# Patient Record
Sex: FEMALE | Race: ASIAN | NOT HISPANIC OR LATINO
[De-identification: names, ages, dates, MRNs, and addresses within clinical notes are randomized per-mention and may not be internally consistent; named-entity substitution may affect disease eponyms.]

---

## 2018-10-12 ENCOUNTER — RESULT REVIEW (OUTPATIENT)
Age: 43
End: 2018-10-12

## 2019-09-12 ENCOUNTER — FORM ENCOUNTER (OUTPATIENT)
Age: 44
End: 2019-09-12

## 2020-01-30 ENCOUNTER — RESULT REVIEW (OUTPATIENT)
Age: 45
End: 2020-01-30

## 2020-02-20 ENCOUNTER — RESULT REVIEW (OUTPATIENT)
Age: 45
End: 2020-02-20

## 2020-04-19 ENCOUNTER — FORM ENCOUNTER (OUTPATIENT)
Age: 45
End: 2020-04-19

## 2020-10-15 ENCOUNTER — FORM ENCOUNTER (OUTPATIENT)
Age: 45
End: 2020-10-15

## 2021-03-04 ENCOUNTER — RESULT REVIEW (OUTPATIENT)
Age: 46
End: 2021-03-04

## 2021-06-01 PROBLEM — Z00.00 ENCOUNTER FOR PREVENTIVE HEALTH EXAMINATION: Status: ACTIVE | Noted: 2021-06-01

## 2021-10-20 DIAGNOSIS — Z80.0 FAMILY HISTORY OF MALIGNANT NEOPLASM OF DIGESTIVE ORGANS: ICD-10-CM

## 2021-10-20 DIAGNOSIS — Z78.9 OTHER SPECIFIED HEALTH STATUS: ICD-10-CM

## 2021-10-22 ENCOUNTER — APPOINTMENT (OUTPATIENT)
Dept: BREAST CENTER | Facility: CLINIC | Age: 46
End: 2021-10-22

## 2021-10-26 ENCOUNTER — NON-APPOINTMENT (OUTPATIENT)
Age: 46
End: 2021-10-26

## 2021-10-26 ENCOUNTER — APPOINTMENT (OUTPATIENT)
Dept: BREAST CENTER | Facility: CLINIC | Age: 46
End: 2021-10-26
Payer: COMMERCIAL

## 2021-10-26 VITALS
BODY MASS INDEX: 23.9 KG/M2 | WEIGHT: 140 LBS | HEART RATE: 85 BPM | OXYGEN SATURATION: 95 % | DIASTOLIC BLOOD PRESSURE: 99 MMHG | HEIGHT: 64 IN | SYSTOLIC BLOOD PRESSURE: 151 MMHG

## 2021-10-26 PROCEDURE — 99213 OFFICE O/P EST LOW 20 MIN: CPT

## 2021-10-26 NOTE — REASON FOR VISIT
[Follow-Up: _____] : a [unfilled] follow-up visit [FreeTextEntry1] : The patient comes in with a history of a right breast 3:00 area of highly suspicious calcifications seen on mammography in October 2015.  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  She underwent the right breast total mastectomy and direct to implant reconstruction on December 14, 2015 and had 3 negative sentinel lymph nodes which is intermediate grade DCIS but negative margins.  She decided against any postoperative endocrine therapy and comes in now for routine follow-up and gets continued left breast mammography.

## 2021-10-26 NOTE — ASSESSMENT
[FreeTextEntry1] : The patient is a 46-year-old  premenopausal female of [place] descent.  She underwent menarche at age 11 and had her first child at age 32.  She has no family history of breast or ovarian cancer but her father passed away from colon cancer at age 56.  The patient underwent her for screening mammography in 2015 showing some highly suspicious calcifications in the right breast medial 3:00 region as well as some right breast retroareolar calcifications.  There was some left breast upper inner quadrant calcifications as well.  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  She underwent [employer] genetic testing preoperatively which was negative.  Preoperative MRI performed on 2015 just showed the known biopsy-proven DCIS in the right breast and the left breast was negative.  She underwent a right breast total mastectomy and direct to implant reconstruction on 2015 and had 3 negative sentinel lymph nodes and pathology just showed some residual intermediate grade DCIS in the right breast.  She did well postoperatively and decided against any endocrine therapy since she did have a mastectomy for DCIS.  Last left breast mammography and ultrasound performed at [facility] on 2020 just showed some stable scattered calcifications and benign densities.  She is due for her left breast mammography and ultrasound again in 2021 and was given prescriptions.  She was reassured and I would like to see her again in 1 year in 2022 for routine follow-up.
no

## 2021-10-26 NOTE — PHYSICAL EXAM
[Normocephalic] : normocephalic [Atraumatic] : atraumatic [EOMI] : extra ocular movement intact [Supple] : supple [No Supraclavicular Adenopathy] : no supraclavicular adenopathy [No Cervical Adenopathy] : no cervical adenopathy [Examined in the supine and seated position] : examined in the supine and seated position [No dominant masses] : no dominant masses in right breast  [No dominant masses] : no dominant masses left breast [No Nipple Retraction] : no left nipple retraction [No Nipple Discharge] : no left nipple discharge [Breast Mass Right Breast ___cm] : no masses [Breast Nipple Inversion Left] : nipple not inverted [Breast Nipple Retraction Left] : nipple not retracted [Breast Nipple Flattening Left] : nipple not flattened [Breast Nipple Fissures Left] : nipple not fissured [Breast Abnormal Lactation (Galactorrhea) Left] : no galactorrhea [Breast Abnormal Secretion Bloody Fluid Left] : no bloody discharge [Breast Abnormal Secretion Serous Fluid Left] : no serous discharge [Breast Abnormal Secretion Opalescent Fluid Left] : no milky discharge [Breast Mass Left Breast ___cm] : no masses [No Axillary Lymphadenopathy] : no left axillary lymphadenopathy [No Edema] : no edema [No Rashes] : no rashes [No Ulceration] : no ulceration [de-identified] : On exam, the patient has the obvious right breast total mastectomy with direct to implant reconstruction.  She has no evidence of recurrence over the right implant.  She has no suspicious findings in the left breast.  She has no axillary, supraclavicular, or cervical adenopathy. [de-identified] : Status post total mastectomy with direct to implant reconstruction with no evidence of recurrence

## 2021-10-26 NOTE — HISTORY OF PRESENT ILLNESS
[FreeTextEntry1] : The patient is a 46-year-old  premenopausal female of Chinese descent.  She underwent menarche at age 11 and had her first child at age 32.  She has no family history of breast or ovarian cancer but her father passed away from colon cancer at age 56.  The patient underwent her for screening mammography in 2015 showing some highly suspicious calcifications in the right breast medial 3:00 region as well as some right breast retroareolar calcifications.  There was some left breast upper inner quadrant calcifications as well.  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  She underwent [employer] genetic testing preoperatively which was negative.  Preoperative MRI performed on 2015 just showed the known biopsy-proven DCIS in the right breast and the left breast was negative.  She underwent a right breast total mastectomy and direct to implant reconstruction on 2015 and had 3 negative sentinel lymph nodes and pathology just showed some residual intermediate grade DCIS in the right breast.  She did well postoperatively and decided against any endocrine therapy since she did have a mastectomy for DCIS.  She comes in for yearly follow-up and continues to get routine left breast mammography.

## 2022-04-07 ENCOUNTER — RESULT REVIEW (OUTPATIENT)
Age: 47
End: 2022-04-07

## 2022-11-28 ENCOUNTER — RESULT REVIEW (OUTPATIENT)
Age: 47
End: 2022-11-28

## 2022-12-02 ENCOUNTER — APPOINTMENT (OUTPATIENT)
Dept: BREAST CENTER | Facility: CLINIC | Age: 47
End: 2022-12-02

## 2022-12-02 ENCOUNTER — NON-APPOINTMENT (OUTPATIENT)
Age: 47
End: 2022-12-02

## 2022-12-02 VITALS
HEART RATE: 73 BPM | BODY MASS INDEX: 22.2 KG/M2 | HEIGHT: 64 IN | DIASTOLIC BLOOD PRESSURE: 82 MMHG | WEIGHT: 130 LBS | SYSTOLIC BLOOD PRESSURE: 125 MMHG | OXYGEN SATURATION: 99 %

## 2022-12-02 DIAGNOSIS — Z12.31 ENCOUNTER FOR SCREENING MAMMOGRAM FOR MALIGNANT NEOPLASM OF BREAST: ICD-10-CM

## 2022-12-02 PROCEDURE — 99213 OFFICE O/P EST LOW 20 MIN: CPT

## 2022-12-02 NOTE — ASSESSMENT
[FreeTextEntry1] : The patient is a 47-year-old  premenopausal female of Chinese descent.  She underwent menarche at age 11 and had her first child at age 32.  She has no family history of breast or ovarian cancer but her father passed away from colon cancer at age 56.  The patient underwent a screening mammography on 2015 showing some highly suspicious calcifications in the right breast medial 3:00 region as well as some right breast retroareolar calcifications.  There was some left breast upper inner quadrant calcifications as well.  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  She underwent [employer] genetic testing preoperatively which was negative.  Preoperative MRI performed on 2015 just showed the known biopsy-proven DCIS in the right breast and the left breast was negative.  She underwent a right breast total mastectomy and direct to implant reconstruction on 2015 and had 3 negative sentinel lymph nodes and pathology just showed some residual intermediate grade DCIS in the right breast.  She did well postoperatively and decided against any endocrine therapy since she did have a mastectomy for DCIS.  Her last left breast mammography and ultrasound performed at [facility] on 2022 was reviewed and just showed some stable scattered calcifications and benign densities.  She is due for her left breast mammography and ultrasound again in 2023 and was given prescriptions.  She was reassured and I would like to see her again in 1 year in 2023 for routine follow-up.

## 2022-12-02 NOTE — HISTORY OF PRESENT ILLNESS
[FreeTextEntry1] : The patient is a 47-year-old  premenopausal female of Chinese descent.  She underwent menarche at age 11 and had her first child at age 32.  She has no family history of breast or ovarian cancer but her father passed away from colon cancer at age 56.  The patient underwent a screening mammography on 2015 showing some highly suspicious calcifications in the right breast medial 3:00 region as well as some right breast retroareolar calcifications.  There was some left breast upper inner quadrant calcifications as well.  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  She underwent [employer] genetic testing preoperatively which was negative.  Preoperative MRI performed on 2015 just showed the known biopsy-proven DCIS in the right breast and the left breast was negative.  She underwent a right breast total mastectomy and direct to implant reconstruction on 2015 and had 3 negative sentinel lymph nodes and pathology just showed some residual intermediate grade DCIS in the right breast.  She did well postoperatively and decided against any endocrine therapy since she did have a mastectomy for DCIS.  She comes in for yearly follow-up and continues to get routine left breast mammography.

## 2022-12-02 NOTE — PHYSICAL EXAM
[Normocephalic] : normocephalic [Atraumatic] : atraumatic [EOMI] : extra ocular movement intact [Supple] : supple [No Supraclavicular Adenopathy] : no supraclavicular adenopathy [No Cervical Adenopathy] : no cervical adenopathy [Examined in the supine and seated position] : examined in the supine and seated position [No dominant masses] : no dominant masses in right breast  [No dominant masses] : no dominant masses left breast [No Nipple Retraction] : no left nipple retraction [No Nipple Discharge] : no left nipple discharge [Breast Mass Right Breast ___cm] : no masses [Breast Mass Left Breast ___cm] : no masses [No Axillary Lymphadenopathy] : no left axillary lymphadenopathy [No Edema] : no edema [No Rashes] : no rashes [No Ulceration] : no ulceration [Breast Nipple Inversion Left] : nipple not inverted [Breast Nipple Retraction Left] : nipple not retracted [Breast Nipple Flattening Left] : nipple not flattened [Breast Nipple Fissures Left] : nipple not fissured [Breast Abnormal Lactation (Galactorrhea) Left] : no galactorrhea [Breast Abnormal Secretion Bloody Fluid Left] : no bloody discharge [Breast Abnormal Secretion Serous Fluid Left] : no serous discharge [Breast Abnormal Secretion Opalescent Fluid Left] : no milky discharge [de-identified] : On exam, the patient has the obvious right breast total mastectomy with direct to implant reconstruction.  She has no evidence of recurrence over the right implant.  She has no suspicious findings in the left breast.  She has no axillary, supraclavicular, or cervical adenopathy. [de-identified] : Status post total mastectomy with direct to implant reconstruction with no evidence of recurrence

## 2023-07-27 ENCOUNTER — OFFICE (OUTPATIENT)
Dept: URBAN - METROPOLITAN AREA CLINIC 122 | Facility: CLINIC | Age: 48
Setting detail: OPHTHALMOLOGY
End: 2023-07-27
Payer: COMMERCIAL

## 2023-07-27 ENCOUNTER — RX ONLY (RX ONLY)
Age: 48
End: 2023-07-27

## 2023-07-27 DIAGNOSIS — H35.40: ICD-10-CM

## 2023-07-27 DIAGNOSIS — H44.23: ICD-10-CM

## 2023-07-27 DIAGNOSIS — H40.1223: ICD-10-CM

## 2023-07-27 PROCEDURE — 99204 OFFICE O/P NEW MOD 45 MIN: CPT | Performed by: OPHTHALMOLOGY

## 2023-07-27 PROCEDURE — 92133 CPTRZD OPH DX IMG PST SGM ON: CPT | Performed by: OPHTHALMOLOGY

## 2023-07-27 PROCEDURE — 92020 GONIOSCOPY: CPT | Performed by: OPHTHALMOLOGY

## 2023-07-27 PROCEDURE — 76514 ECHO EXAM OF EYE THICKNESS: CPT | Performed by: OPHTHALMOLOGY

## 2023-07-27 ASSESSMENT — REFRACTION_AUTOREFRACTION
OS_SPHERE: -7.75
OD_SPHERE: -8.00
OS_AXIS: 30
OD_AXIS: 5
OS_CYLINDER: -0.50
OD_CYLINDER: -0.50

## 2023-07-27 ASSESSMENT — REFRACTION_CURRENTRX
OD_SPHERE: -7.75
OS_AXIS: 5
OS_OVR_VA: 20/
OD_CYLINDER: -0.50
OS_CYLINDER: -0.75
OD_OVR_VA: 20/
OS_SPHERE: -8.00
OD_AXIS: 56

## 2023-07-27 ASSESSMENT — TONOMETRY
OD_IOP_MMHG: 11
OS_IOP_MMHG: 11

## 2023-07-27 ASSESSMENT — VISUAL ACUITY
OS_BCVA: 20/20
OD_BCVA: 20/50+1

## 2023-07-27 ASSESSMENT — CONFRONTATIONAL VISUAL FIELD TEST (CVF)
OS_FINDINGS: FULL
OD_FINDINGS: FULL

## 2023-07-27 ASSESSMENT — PACHYMETRY
OS_CT_UM: 538
OD_CT_UM: 521
OS_CT_CORRECTION: 1
OD_CT_CORRECTION: 1

## 2023-07-27 ASSESSMENT — SPHEQUIV_DERIVED
OS_SPHEQUIV: -8
OD_SPHEQUIV: -8.25

## 2023-09-08 ENCOUNTER — OFFICE (OUTPATIENT)
Dept: URBAN - METROPOLITAN AREA CLINIC 122 | Facility: CLINIC | Age: 48
Setting detail: OPHTHALMOLOGY
End: 2023-09-08
Payer: COMMERCIAL

## 2023-09-08 DIAGNOSIS — H40.1223: ICD-10-CM

## 2023-09-08 DIAGNOSIS — H35.40: ICD-10-CM

## 2023-09-08 DIAGNOSIS — H44.23: ICD-10-CM

## 2023-09-08 DIAGNOSIS — H40.011: ICD-10-CM

## 2023-09-08 PROCEDURE — 92012 INTRM OPH EXAM EST PATIENT: CPT | Performed by: OPHTHALMOLOGY

## 2023-09-08 PROCEDURE — 92083 EXTENDED VISUAL FIELD XM: CPT | Performed by: OPHTHALMOLOGY

## 2023-09-08 ASSESSMENT — REFRACTION_CURRENTRX
OD_CYLINDER: -0.50
OS_OVR_VA: 20/
OD_SPHERE: -7.75
OS_AXIS: 5
OS_SPHERE: -8.00
OD_AXIS: 56
OS_CYLINDER: -0.75
OD_OVR_VA: 20/

## 2023-09-08 ASSESSMENT — REFRACTION_AUTOREFRACTION
OS_AXIS: 30
OS_SPHERE: -7.75
OD_AXIS: 5
OS_CYLINDER: -0.50
OD_SPHERE: -8.00
OD_CYLINDER: -0.50

## 2023-09-08 ASSESSMENT — PACHYMETRY
OD_CT_CORRECTION: 1
OD_CT_UM: 521
OS_CT_CORRECTION: 1
OS_CT_UM: 538

## 2023-09-08 ASSESSMENT — TONOMETRY
OD_IOP_MMHG: 13
OS_IOP_MMHG: 13

## 2023-09-08 ASSESSMENT — VISUAL ACUITY
OD_BCVA: 20/50
OS_BCVA: 20/20

## 2023-09-08 ASSESSMENT — SPHEQUIV_DERIVED
OS_SPHEQUIV: -8
OD_SPHEQUIV: -8.25

## 2023-11-17 ENCOUNTER — OFFICE (OUTPATIENT)
Dept: URBAN - METROPOLITAN AREA CLINIC 122 | Facility: CLINIC | Age: 48
Setting detail: OPHTHALMOLOGY
End: 2023-11-17
Payer: COMMERCIAL

## 2023-11-17 DIAGNOSIS — H40.011: ICD-10-CM

## 2023-11-17 DIAGNOSIS — H44.23: ICD-10-CM

## 2023-11-17 DIAGNOSIS — H40.1223: ICD-10-CM

## 2023-11-17 DIAGNOSIS — H35.40: ICD-10-CM

## 2023-11-17 PROBLEM — H40.1211: Status: ACTIVE | Noted: 2023-11-17

## 2023-11-17 PROCEDURE — 92012 INTRM OPH EXAM EST PATIENT: CPT | Performed by: OPHTHALMOLOGY

## 2023-11-17 ASSESSMENT — REFRACTION_CURRENTRX
OS_AXIS: 5
OD_CYLINDER: -0.50
OD_OVR_VA: 20/
OD_AXIS: 56
OD_SPHERE: -7.75
OS_SPHERE: -8.00
OS_CYLINDER: -0.75
OS_OVR_VA: 20/

## 2023-11-17 ASSESSMENT — SPHEQUIV_DERIVED
OD_SPHEQUIV: -8.25
OS_SPHEQUIV: -8

## 2023-11-17 ASSESSMENT — REFRACTION_AUTOREFRACTION
OD_AXIS: 5
OS_AXIS: 30
OS_CYLINDER: -0.50
OD_SPHERE: -8.00
OD_CYLINDER: -0.50
OS_SPHERE: -7.75

## 2023-11-17 ASSESSMENT — CONFRONTATIONAL VISUAL FIELD TEST (CVF)
OS_FINDINGS: FULL
OD_FINDINGS: FULL

## 2023-11-26 ENCOUNTER — NON-APPOINTMENT (OUTPATIENT)
Age: 48
End: 2023-11-26

## 2023-11-29 ENCOUNTER — RESULT REVIEW (OUTPATIENT)
Age: 48
End: 2023-11-29

## 2023-12-04 ENCOUNTER — RX ONLY (RX ONLY)
Age: 48
End: 2023-12-04

## 2023-12-04 ENCOUNTER — OFFICE (OUTPATIENT)
Dept: URBAN - METROPOLITAN AREA CLINIC 29 | Facility: CLINIC | Age: 48
Setting detail: OPHTHALMOLOGY
End: 2023-12-04
Payer: COMMERCIAL

## 2023-12-04 DIAGNOSIS — H35.40: ICD-10-CM

## 2023-12-04 DIAGNOSIS — H40.1211: ICD-10-CM

## 2023-12-04 DIAGNOSIS — H40.011: ICD-10-CM

## 2023-12-04 DIAGNOSIS — H44.23: ICD-10-CM

## 2023-12-04 DIAGNOSIS — H40.1223: ICD-10-CM

## 2023-12-04 PROCEDURE — 99213 OFFICE O/P EST LOW 20 MIN: CPT | Performed by: OPHTHALMOLOGY

## 2023-12-04 PROCEDURE — 92020 GONIOSCOPY: CPT | Performed by: OPHTHALMOLOGY

## 2023-12-04 ASSESSMENT — REFRACTION_CURRENTRX
OS_AXIS: 5
OD_OVR_VA: 20/
OD_CYLINDER: -0.50
OD_AXIS: 56
OS_CYLINDER: -0.75
OS_OVR_VA: 20/
OS_SPHERE: -8.00
OD_SPHERE: -7.75

## 2023-12-04 ASSESSMENT — REFRACTION_AUTOREFRACTION
OS_AXIS: 30
OD_SPHERE: -8.00
OD_AXIS: 5
OS_CYLINDER: -0.50
OS_SPHERE: -7.75
OD_CYLINDER: -0.50

## 2023-12-04 ASSESSMENT — SPHEQUIV_DERIVED
OS_SPHEQUIV: -8
OD_SPHEQUIV: -8.25

## 2023-12-04 ASSESSMENT — CONFRONTATIONAL VISUAL FIELD TEST (CVF)
OS_FINDINGS: FULL
OD_FINDINGS: FULL

## 2023-12-20 ENCOUNTER — RX ONLY (RX ONLY)
Age: 48
End: 2023-12-20

## 2023-12-20 ENCOUNTER — OFFICE (OUTPATIENT)
Dept: URBAN - METROPOLITAN AREA CLINIC 122 | Facility: CLINIC | Age: 48
Setting detail: OPHTHALMOLOGY
End: 2023-12-20
Payer: COMMERCIAL

## 2023-12-20 DIAGNOSIS — H40.1211: ICD-10-CM

## 2023-12-20 DIAGNOSIS — H44.23: ICD-10-CM

## 2023-12-20 DIAGNOSIS — H40.1223: ICD-10-CM

## 2023-12-20 DIAGNOSIS — H40.011: ICD-10-CM

## 2023-12-20 DIAGNOSIS — H35.40: ICD-10-CM

## 2023-12-20 PROCEDURE — 92012 INTRM OPH EXAM EST PATIENT: CPT | Performed by: OPHTHALMOLOGY

## 2023-12-20 ASSESSMENT — REFRACTION_CURRENTRX
OS_CYLINDER: -0.75
OD_OVR_VA: 20/
OD_AXIS: 56
OS_SPHERE: -8.00
OS_AXIS: 5
OD_CYLINDER: -0.50
OD_SPHERE: -7.75
OS_OVR_VA: 20/

## 2023-12-20 ASSESSMENT — REFRACTION_AUTOREFRACTION
OD_CYLINDER: -0.50
OS_CYLINDER: -0.50
OD_SPHERE: -8.00
OS_AXIS: 30
OS_SPHERE: -7.75
OD_AXIS: 5

## 2023-12-20 ASSESSMENT — CONFRONTATIONAL VISUAL FIELD TEST (CVF)
OD_FINDINGS: FULL
OS_FINDINGS: FULL

## 2023-12-20 ASSESSMENT — SPHEQUIV_DERIVED
OD_SPHEQUIV: -8.25
OS_SPHEQUIV: -8

## 2023-12-31 ENCOUNTER — NON-APPOINTMENT (OUTPATIENT)
Age: 48
End: 2023-12-31

## 2023-12-31 NOTE — REASON FOR VISIT
[Follow-Up: _____] : a [unfilled] follow-up visit [FreeTextEntry1] : The patient comes in with a history of a right breast 3:00 area of highly suspicious calcifications seen on mammography in October 2015.  Stereotactic biopsy showed intermediate to high-grade DCIS which was ER/CT positive.  She underwent the right breast total mastectomy and direct to implant reconstruction on December 14, 2015 and had 3 negative sentinel lymph nodes which is intermediate grade DCIS but negative margins.  She decided against any postoperative endocrine therapy and comes in now for routine follow-up and gets continued left breast mammography.

## 2023-12-31 NOTE — PHYSICAL EXAM
[Normocephalic] : normocephalic [Atraumatic] : atraumatic [EOMI] : extra ocular movement intact [Supple] : supple [No Supraclavicular Adenopathy] : no supraclavicular adenopathy [Examined in the supine and seated position] : examined in the supine and seated position [No Cervical Adenopathy] : no cervical adenopathy [No dominant masses] : no dominant masses in right breast  [No dominant masses] : no dominant masses left breast [No Nipple Retraction] : no left nipple retraction [No Nipple Discharge] : no left nipple discharge [Breast Mass Right Breast ___cm] : no masses [Breast Mass Left Breast ___cm] : no masses [No Axillary Lymphadenopathy] : no left axillary lymphadenopathy [No Edema] : no edema [No Rashes] : no rashes [No Ulceration] : no ulceration [Breast Nipple Inversion Left] : nipple not inverted [Breast Nipple Retraction Left] : nipple not retracted [Breast Nipple Flattening Left] : nipple not flattened [Breast Nipple Fissures Left] : nipple not fissured [Breast Abnormal Lactation (Galactorrhea) Left] : no galactorrhea [Breast Abnormal Secretion Bloody Fluid Left] : no bloody discharge [Breast Abnormal Secretion Serous Fluid Left] : no serous discharge [Breast Abnormal Secretion Opalescent Fluid Left] : no milky discharge [de-identified] : On exam, the patient has the obvious right breast total mastectomy with direct to implant reconstruction.  She has no evidence of recurrence over the right implant.  She has no suspicious findings in the left breast.  She has no axillary, supraclavicular, or cervical adenopathy. [de-identified] : Status post total mastectomy with direct to implant reconstruction with no evidence of recurrence

## 2023-12-31 NOTE — ASSESSMENT
[FreeTextEntry1] : The patient is a 48-year-old  premenopausal female of Chinese descent. She underwent menarche at age 11 and had her first child at age 32. She has no family history of breast or ovarian cancer but her father passed away from colon cancer at age 56. The patient underwent a screening mammography on 2015 showing some highly suspicious calcifications in the right breast medial 3:00 region as well as some right breast retroareolar calcifications. There was some left breast upper inner quadrant calcifications as well. 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 She underwent [employer] genetic testing preoperatively which was negative. Preoperative MRI performed on 2015 just showed the known biopsy-proven DCIS in the right breast and the left breast was negative. She underwent a right breast total mastectomy and direct to implant reconstruction on 2015 and had 3 negative sentinel lymph nodes and pathology just showed some residual intermediate grade DCIS in the right breast. She did well postoperatively and decided against any endocrine therapy since she did have a mastectomy for DCIS. Her last left breast mammography and ultrasound performed at [facility] on ?????? 2022 was reviewed and just showed some stable scattered calcifications and benign densities. She is due for her left breast mammography and ultrasound again in ?????? 2024 and was given prescriptions. She was reassured and I would like to see her again in 1 year in 2024 for routine follow-up

## 2023-12-31 NOTE — HISTORY OF PRESENT ILLNESS
[FreeTextEntry1] : The patient is a 48-year-old  premenopausal female of Chinese descent.  She underwent menarche at age 11 and had her first child at age 32.  She has no family history of breast or ovarian cancer but her father passed away from colon cancer at age 56.  The patient underwent a screening mammography on 2015 showing some highly suspicious calcifications in the right breast medial 3:00 region as well as some right breast retroareolar calcifications.  There was some left breast upper inner quadrant calcifications as well.  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  She underwent [employer] genetic testing preoperatively which was negative.  Preoperative MRI performed on 2015 just showed the known biopsy-proven DCIS in the right breast and the left breast was negative.  She underwent a right breast total mastectomy and direct to implant reconstruction on 2015 and had 3 negative sentinel lymph nodes and pathology just showed some residual intermediate grade DCIS in the right breast.  She did well postoperatively and decided against any endocrine therapy since she did have a mastectomy for DCIS.  She comes in for yearly follow-up and continues to get routine left breast mammography.

## 2024-01-02 NOTE — REASON FOR VISIT
[Follow-Up: _____] : a [unfilled] follow-up visit [FreeTextEntry1] : The patient comes in with a history of a right breast 3:00 area of highly suspicious calcifications seen on mammography in October 2015.  Stereotactic biopsy showed intermediate to high-grade DCIS which was ER/NC positive.  She underwent the right breast total mastectomy and direct to implant reconstruction on December 14, 2015 and had 3 negative sentinel lymph nodes which is intermediate grade DCIS but negative margins.  She decided against any postoperative endocrine therapy and comes in now for routine follow-up and gets continued left breast mammography.

## 2024-01-02 NOTE — HISTORY OF PRESENT ILLNESS
[FreeTextEntry1] : The patient is a 48-year-old  premenopausal female of Chinese descent.  She underwent menarche at age 11 and had her first child at age 32.  She has no family history of breast or ovarian cancer but her father passed away from colon cancer at age 56.  The patient underwent a screening mammography on 2015 showing some highly suspicious calcifications in the right breast medial 3:00 region as well as some right breast retroareolar calcifications.  There was some left breast upper inner quadrant calcifications as well.  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  She underwent [employer] genetic testing preoperatively which was negative.  Preoperative MRI performed on 2015 just showed the known biopsy-proven DCIS in the right breast and the left breast was negative.  She underwent a right breast total mastectomy and direct to implant reconstruction on 2015 and had 3 negative sentinel lymph nodes and pathology just showed some residual intermediate grade DCIS in the right breast.  She did well postoperatively and decided against any endocrine therapy since she did have a mastectomy for DCIS.  She comes in for yearly follow-up and continues to get routine left breast mammography.

## 2024-01-02 NOTE — ASSESSMENT
[FreeTextEntry1] : The patient is a 48-year-old  premenopausal female of Chinese descent. She underwent menarche at age 11 and had her first child at age 32. She has no family history of breast or ovarian cancer but her father passed away from colon cancer at age 56. The patient underwent a screening mammography on 2015 showing some highly suspicious calcifications in the right breast medial 3:00 region as well as some right breast retroareolar calcifications. There was some left breast upper inner quadrant calcifications as well. 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 She underwent [employer] genetic testing preoperatively which was negative. Preoperative MRI performed on 2015 just showed the known biopsy-proven DCIS in the right breast and the left breast was negative. She underwent a right breast total mastectomy and direct to implant reconstruction on 2015 and had 3 negative sentinel lymph nodes and pathology just showed some residual intermediate grade DCIS in the right breast. She did well postoperatively and decided against any endocrine therapy since she did have a mastectomy for DCIS. Her last left breast mammography and ultrasound performed at [facility] on 2023 was reviewed and just showed some stable benign densities. She is due for her left breast mammography and ultrasound again in 2024 and was given prescriptions. She was reassured and I would like to see her again in 1 year in 2025 for routine follow-up

## 2024-01-02 NOTE — PHYSICAL EXAM
[Normocephalic] : normocephalic [Atraumatic] : atraumatic [EOMI] : extra ocular movement intact [Supple] : supple [No Supraclavicular Adenopathy] : no supraclavicular adenopathy [No Cervical Adenopathy] : no cervical adenopathy [Examined in the supine and seated position] : examined in the supine and seated position [No dominant masses] : no dominant masses in right breast  [No dominant masses] : no dominant masses left breast [No Nipple Retraction] : no left nipple retraction [No Nipple Discharge] : no left nipple discharge [Breast Mass Right Breast ___cm] : no masses [Breast Mass Left Breast ___cm] : no masses [No Axillary Lymphadenopathy] : no left axillary lymphadenopathy [No Edema] : no edema [No Rashes] : no rashes [No Ulceration] : no ulceration [Breast Nipple Inversion Left] : nipple not inverted [Breast Nipple Retraction Left] : nipple not retracted [Breast Nipple Fissures Left] : nipple not fissured [Breast Nipple Flattening Left] : nipple not flattened [Breast Abnormal Lactation (Galactorrhea) Left] : no galactorrhea [Breast Abnormal Secretion Bloody Fluid Left] : no bloody discharge [Breast Abnormal Secretion Opalescent Fluid Left] : no milky discharge [Breast Abnormal Secretion Serous Fluid Left] : no serous discharge [de-identified] : On exam, the patient has the obvious right breast total mastectomy with direct to implant reconstruction.  She has no evidence of recurrence over the right implant.  She has no suspicious findings in the left breast.  She has no axillary, supraclavicular, or cervical adenopathy. [de-identified] : Status post total mastectomy with direct to implant reconstruction with no evidence of recurrence

## 2024-01-17 ENCOUNTER — OFFICE (OUTPATIENT)
Dept: URBAN - METROPOLITAN AREA CLINIC 122 | Facility: CLINIC | Age: 49
Setting detail: OPHTHALMOLOGY
End: 2024-01-17
Payer: COMMERCIAL

## 2024-01-17 ENCOUNTER — RX ONLY (RX ONLY)
Age: 49
End: 2024-01-17

## 2024-01-17 DIAGNOSIS — H35.40: ICD-10-CM

## 2024-01-17 DIAGNOSIS — H40.1223: ICD-10-CM

## 2024-01-17 DIAGNOSIS — H44.23: ICD-10-CM

## 2024-01-17 DIAGNOSIS — H40.1211: ICD-10-CM

## 2024-01-17 DIAGNOSIS — H40.011: ICD-10-CM

## 2024-01-17 DIAGNOSIS — H16.223: ICD-10-CM

## 2024-01-17 PROCEDURE — 92012 INTRM OPH EXAM EST PATIENT: CPT | Performed by: OPHTHALMOLOGY

## 2024-01-17 ASSESSMENT — CONFRONTATIONAL VISUAL FIELD TEST (CVF)
OS_FINDINGS: FULL
OD_FINDINGS: FULL

## 2024-01-17 ASSESSMENT — REFRACTION_CURRENTRX
OD_CYLINDER: -0.50
OD_SPHERE: -7.75
OS_CYLINDER: -0.75
OD_AXIS: 56
OS_OVR_VA: 20/
OD_OVR_VA: 20/
OS_AXIS: 5
OS_SPHERE: -8.00

## 2024-01-17 ASSESSMENT — REFRACTION_AUTOREFRACTION
OD_AXIS: 5
OS_SPHERE: -7.75
OS_AXIS: 30
OD_CYLINDER: -0.50
OS_CYLINDER: -0.50
OD_SPHERE: -8.00

## 2024-01-17 ASSESSMENT — SPHEQUIV_DERIVED
OS_SPHEQUIV: -8
OD_SPHEQUIV: -8.25

## 2024-01-17 ASSESSMENT — SUPERFICIAL PUNCTATE KERATITIS (SPK)
OS_SPK: 3+
OD_SPK: 3+

## 2024-01-23 ENCOUNTER — APPOINTMENT (OUTPATIENT)
Dept: BREAST CENTER | Facility: CLINIC | Age: 49
End: 2024-01-23
Payer: COMMERCIAL

## 2024-01-23 DIAGNOSIS — R92.30 DENSE BREASTS, UNSPECIFIED: ICD-10-CM

## 2024-01-23 DIAGNOSIS — N60.12 DIFFUSE CYSTIC MASTOPATHY OF LEFT BREAST: ICD-10-CM

## 2024-01-23 DIAGNOSIS — Z90.11 ACQUIRED ABSENCE OF RIGHT BREAST AND NIPPLE: ICD-10-CM

## 2024-01-23 DIAGNOSIS — Z86.000 PERSONAL HISTORY OF IN-SITU NEOPLASM OF BREAST: ICD-10-CM

## 2024-01-23 PROCEDURE — 99213 OFFICE O/P EST LOW 20 MIN: CPT

## 2024-01-23 NOTE — HISTORY OF PRESENT ILLNESS
[FreeTextEntry1] : The patient is a 48-year-old  premenopausal female of Chinese descent.  She underwent menarche at age 11 and had her first child at age 32.  She has no family history of breast or ovarian cancer but her father passed away from colon cancer at age 56.  The patient underwent a screening mammography on 2015 showing some highly suspicious calcifications in the right breast medial 3:00 region as well as some right breast retroareolar calcifications.  There was some left breast upper inner quadrant calcifications as well.  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  She underwent [employer] genetic testing preoperatively which was negative.  Preoperative MRI performed on 2015 just showed the known biopsy-proven DCIS in the right breast and the left breast was negative.  She underwent a right breast total mastectomy and direct to implant reconstruction on 2015 and had 3 negative sentinel lymph nodes and pathology just showed some residual intermediate grade DCIS in the right breast.  She did well postoperatively and decided against any endocrine therapy since she did have a mastectomy for DCIS.  She comes in for yearly follow-up and continues to get routine left breast mammography.

## 2024-01-23 NOTE — REASON FOR VISIT
[Follow-Up: _____] : a [unfilled] follow-up visit [FreeTextEntry1] : The patient comes in with a history of a right breast 3:00 area of highly suspicious calcifications seen on mammography in October 2015.  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  She underwent the right breast total mastectomy and direct to implant reconstruction on December 14, 2015 and had 3 negative sentinel lymph nodes which is intermediate grade DCIS but negative margins.  She decided against any postoperative endocrine therapy and comes in now for routine follow-up and gets continued left breast mammography.

## 2024-01-23 NOTE — PHYSICAL EXAM
[Normocephalic] : normocephalic [Atraumatic] : atraumatic [EOMI] : extra ocular movement intact [Supple] : supple [No Supraclavicular Adenopathy] : no supraclavicular adenopathy [No Cervical Adenopathy] : no cervical adenopathy [Examined in the supine and seated position] : examined in the supine and seated position [No dominant masses] : no dominant masses in right breast  [No dominant masses] : no dominant masses left breast [No Nipple Retraction] : no left nipple retraction [No Nipple Discharge] : no left nipple discharge [Breast Mass Right Breast ___cm] : no masses [Breast Mass Left Breast ___cm] : no masses [No Axillary Lymphadenopathy] : no left axillary lymphadenopathy [No Edema] : no edema [No Rashes] : no rashes [No Ulceration] : no ulceration [Breast Nipple Inversion Left] : nipple not inverted [Breast Nipple Retraction Left] : nipple not retracted [Breast Nipple Flattening Left] : nipple not flattened [Breast Nipple Fissures Left] : nipple not fissured [Breast Abnormal Lactation (Galactorrhea) Left] : no galactorrhea [Breast Abnormal Secretion Bloody Fluid Left] : no bloody discharge [Breast Abnormal Secretion Serous Fluid Left] : no serous discharge [de-identified] : On exam, the patient has the obvious right breast total mastectomy with direct to implant reconstruction.  She has no evidence of recurrence over the right implant.  She has no suspicious findings in the left breast.  She has no axillary, supraclavicular, or cervical adenopathy. [Breast Abnormal Secretion Opalescent Fluid Left] : no milky discharge [de-identified] : Status post total mastectomy with direct to implant reconstruction with no evidence of recurrence

## 2024-01-23 NOTE — ASSESSMENT
[FreeTextEntry1] : The patient is a 48-year-old  premenopausal female of [place] descent. She underwent menarche at age 11 and had her first child at age 32. She has no family history of breast or ovarian cancer but her father passed away from colon cancer at age 56. The patient underwent a screening mammography on 2015 showing some highly suspicious calcifications in the right breast medial 3:00 region as well as some right breast retroareolar calcifications. There was some left breast upper inner quadrant calcifications as well. 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 She underwent [employer] genetic testing preoperatively which was negative. Preoperative MRI performed on 2015 just showed the known biopsy-proven DCIS in the right breast and the left breast was negative. She underwent a right breast total mastectomy and direct to implant reconstruction on 2015 and had 3 negative sentinel lymph nodes and pathology just showed some residual intermediate grade DCIS in the right breast. She did well postoperatively and decided against any endocrine therapy since she did have a mastectomy for DCIS. Her last left breast mammography and ultrasound performed at [facility] on 2023 was reviewed and just showed some stable benign densities.  On exam today, she is due for her left breast mammography and ultrasound again in 2024 and was given prescriptions. She was reassured and I would like to see her again in 1 year in 2025 for routine follow-up

## 2024-01-30 ENCOUNTER — OFFICE (OUTPATIENT)
Dept: URBAN - METROPOLITAN AREA CLINIC 122 | Facility: CLINIC | Age: 49
Setting detail: OPHTHALMOLOGY
End: 2024-01-30
Payer: COMMERCIAL

## 2024-01-30 DIAGNOSIS — H40.1223: ICD-10-CM

## 2024-01-30 DIAGNOSIS — H40.011: ICD-10-CM

## 2024-01-30 DIAGNOSIS — H44.23: ICD-10-CM

## 2024-01-30 DIAGNOSIS — H16.223: ICD-10-CM

## 2024-01-30 DIAGNOSIS — H35.40: ICD-10-CM

## 2024-01-30 DIAGNOSIS — H40.1211: ICD-10-CM

## 2024-01-30 PROCEDURE — 92083 EXTENDED VISUAL FIELD XM: CPT | Performed by: OPHTHALMOLOGY

## 2024-01-30 PROCEDURE — 92012 INTRM OPH EXAM EST PATIENT: CPT | Performed by: OPHTHALMOLOGY

## 2024-01-30 ASSESSMENT — REFRACTION_CURRENTRX
OD_CYLINDER: -0.50
OS_AXIS: 5
OD_SPHERE: -7.75
OD_OVR_VA: 20/
OS_SPHERE: -8.00
OS_OVR_VA: 20/
OD_AXIS: 56
OS_CYLINDER: -0.75

## 2024-01-30 ASSESSMENT — REFRACTION_AUTOREFRACTION
OD_SPHERE: -8.00
OS_AXIS: 30
OD_CYLINDER: -0.50
OS_SPHERE: -7.75
OD_AXIS: 5
OS_CYLINDER: -0.50

## 2024-01-30 ASSESSMENT — SUPERFICIAL PUNCTATE KERATITIS (SPK)
OD_SPK: 3+
OS_SPK: 3+

## 2024-01-30 ASSESSMENT — SPHEQUIV_DERIVED
OS_SPHEQUIV: -8
OD_SPHEQUIV: -8.25

## 2024-01-30 ASSESSMENT — CONFRONTATIONAL VISUAL FIELD TEST (CVF)
OS_FINDINGS: FULL
OD_FINDINGS: FULL

## 2024-02-16 ENCOUNTER — RX ONLY (RX ONLY)
Age: 49
End: 2024-02-16

## 2024-02-16 ENCOUNTER — OFFICE (OUTPATIENT)
Dept: URBAN - METROPOLITAN AREA CLINIC 122 | Facility: CLINIC | Age: 49
Setting detail: OPHTHALMOLOGY
End: 2024-02-16
Payer: COMMERCIAL

## 2024-02-16 DIAGNOSIS — H40.1211: ICD-10-CM

## 2024-02-16 DIAGNOSIS — H44.23: ICD-10-CM

## 2024-02-16 DIAGNOSIS — H16.223: ICD-10-CM

## 2024-02-16 DIAGNOSIS — H40.1223: ICD-10-CM

## 2024-02-16 DIAGNOSIS — H35.40: ICD-10-CM

## 2024-02-16 DIAGNOSIS — H40.011: ICD-10-CM

## 2024-02-16 PROCEDURE — 92012 INTRM OPH EXAM EST PATIENT: CPT | Performed by: OPHTHALMOLOGY

## 2024-02-16 ASSESSMENT — REFRACTION_CURRENTRX
OD_OVR_VA: 20/
OS_CYLINDER: -0.75
OD_SPHERE: -7.75
OD_AXIS: 56
OS_OVR_VA: 20/
OS_SPHERE: -8.00
OD_CYLINDER: -0.50
OS_AXIS: 5

## 2024-02-16 ASSESSMENT — SUPERFICIAL PUNCTATE KERATITIS (SPK)
OS_SPK: 1+
OD_SPK: 1+

## 2024-02-16 ASSESSMENT — SPHEQUIV_DERIVED
OS_SPHEQUIV: -8
OD_SPHEQUIV: -8.25

## 2024-02-16 ASSESSMENT — REFRACTION_AUTOREFRACTION
OS_CYLINDER: -0.50
OD_CYLINDER: -0.50
OS_AXIS: 30
OD_AXIS: 5
OD_SPHERE: -8.00
OS_SPHERE: -7.75

## 2024-03-20 ENCOUNTER — OFFICE (OUTPATIENT)
Dept: URBAN - METROPOLITAN AREA CLINIC 122 | Facility: CLINIC | Age: 49
Setting detail: OPHTHALMOLOGY
End: 2024-03-20
Payer: COMMERCIAL

## 2024-03-20 DIAGNOSIS — H44.23: ICD-10-CM

## 2024-03-20 DIAGNOSIS — H35.40: ICD-10-CM

## 2024-03-20 DIAGNOSIS — H40.1223: ICD-10-CM

## 2024-03-20 DIAGNOSIS — H40.1211: ICD-10-CM

## 2024-03-20 DIAGNOSIS — H40.011: ICD-10-CM

## 2024-03-20 DIAGNOSIS — H16.223: ICD-10-CM

## 2024-03-20 PROCEDURE — 92012 INTRM OPH EXAM EST PATIENT: CPT | Performed by: OPHTHALMOLOGY

## 2024-03-22 ASSESSMENT — REFRACTION_CURRENTRX
OD_SPHERE: -7.75
OD_AXIS: 56
OS_CYLINDER: -0.75
OS_AXIS: 5
OD_OVR_VA: 20/
OS_OVR_VA: 20/
OS_SPHERE: -8.00
OD_CYLINDER: -0.50

## 2024-05-15 ENCOUNTER — OFFICE (OUTPATIENT)
Dept: URBAN - METROPOLITAN AREA CLINIC 122 | Facility: CLINIC | Age: 49
Setting detail: OPHTHALMOLOGY
End: 2024-05-15
Payer: COMMERCIAL

## 2024-05-15 DIAGNOSIS — H44.23: ICD-10-CM

## 2024-05-15 DIAGNOSIS — H40.1223: ICD-10-CM

## 2024-05-15 DIAGNOSIS — H40.011: ICD-10-CM

## 2024-05-15 DIAGNOSIS — H40.1211: ICD-10-CM

## 2024-05-15 DIAGNOSIS — H35.40: ICD-10-CM

## 2024-05-15 DIAGNOSIS — H16.223: ICD-10-CM

## 2024-05-15 PROCEDURE — 92012 INTRM OPH EXAM EST PATIENT: CPT | Performed by: OPHTHALMOLOGY

## 2024-05-15 ASSESSMENT — CONFRONTATIONAL VISUAL FIELD TEST (CVF)
OS_FINDINGS: FULL
OD_FINDINGS: FULL

## 2024-07-29 ENCOUNTER — OFFICE (OUTPATIENT)
Dept: URBAN - METROPOLITAN AREA CLINIC 122 | Facility: CLINIC | Age: 49
Setting detail: OPHTHALMOLOGY
End: 2024-07-29
Payer: COMMERCIAL

## 2024-07-29 DIAGNOSIS — H40.011: ICD-10-CM

## 2024-07-29 DIAGNOSIS — H35.40: ICD-10-CM

## 2024-07-29 DIAGNOSIS — H40.1223: ICD-10-CM

## 2024-07-29 DIAGNOSIS — H16.223: ICD-10-CM

## 2024-07-29 DIAGNOSIS — H40.1211: ICD-10-CM

## 2024-07-29 DIAGNOSIS — H44.23: ICD-10-CM

## 2024-07-29 PROCEDURE — 92250 FUNDUS PHOTOGRAPHY W/I&R: CPT | Performed by: OPHTHALMOLOGY

## 2024-07-29 PROCEDURE — 92014 COMPRE OPH EXAM EST PT 1/>: CPT | Performed by: OPHTHALMOLOGY

## 2024-07-29 PROCEDURE — 92083 EXTENDED VISUAL FIELD XM: CPT | Performed by: OPHTHALMOLOGY

## 2024-07-29 ASSESSMENT — LID EXAM ASSESSMENTS: OD_EDEMA: RLL 1+ 2+

## 2024-09-04 ENCOUNTER — OFFICE (OUTPATIENT)
Dept: URBAN - METROPOLITAN AREA CLINIC 122 | Facility: CLINIC | Age: 49
Setting detail: OPHTHALMOLOGY
End: 2024-09-04
Payer: COMMERCIAL

## 2024-09-04 DIAGNOSIS — H40.1211: ICD-10-CM

## 2024-09-04 DIAGNOSIS — H40.1223: ICD-10-CM

## 2024-09-04 DIAGNOSIS — H40.011: ICD-10-CM

## 2024-09-04 DIAGNOSIS — H44.23: ICD-10-CM

## 2024-09-04 DIAGNOSIS — H16.223: ICD-10-CM

## 2024-09-04 DIAGNOSIS — H35.40: ICD-10-CM

## 2024-09-04 PROCEDURE — 92083 EXTENDED VISUAL FIELD XM: CPT | Performed by: OPHTHALMOLOGY

## 2024-09-04 PROCEDURE — 92012 INTRM OPH EXAM EST PATIENT: CPT | Performed by: OPHTHALMOLOGY

## 2024-09-04 ASSESSMENT — CONFRONTATIONAL VISUAL FIELD TEST (CVF)
OD_FINDINGS: FULL
OS_FINDINGS: FULL

## 2024-09-04 ASSESSMENT — LID EXAM ASSESSMENTS: OD_EDEMA: ABSENT

## 2024-11-12 ENCOUNTER — OFFICE (OUTPATIENT)
Dept: URBAN - METROPOLITAN AREA CLINIC 122 | Facility: CLINIC | Age: 49
Setting detail: OPHTHALMOLOGY
End: 2024-11-12
Payer: COMMERCIAL

## 2024-11-12 DIAGNOSIS — H16.223: ICD-10-CM

## 2024-11-12 DIAGNOSIS — H40.1223: ICD-10-CM

## 2024-11-12 DIAGNOSIS — H40.1211: ICD-10-CM

## 2024-11-12 DIAGNOSIS — H35.40: ICD-10-CM

## 2024-11-12 DIAGNOSIS — H40.011: ICD-10-CM

## 2024-11-12 DIAGNOSIS — H44.23: ICD-10-CM

## 2024-11-12 PROCEDURE — 92133 CPTRZD OPH DX IMG PST SGM ON: CPT | Performed by: OPHTHALMOLOGY

## 2024-11-12 PROCEDURE — 92012 INTRM OPH EXAM EST PATIENT: CPT | Performed by: OPHTHALMOLOGY

## 2024-11-12 ASSESSMENT — REFRACTION_CURRENTRX
OD_VPRISM_DIRECTION: SV
OD_CYLINDER: -0.50
OS_VPRISM_DIRECTION: SV
OD_SPHERE: -7.50
OS_SPHERE: -8.00
OS_OVR_VA: 20/
OD_OVR_VA: 20/
OD_AXIS: 60
OS_AXIS: 180
OS_CYLINDER: -0.75

## 2024-11-12 ASSESSMENT — REFRACTION_MANIFEST
OD_CYLINDER: -0.50
OD_SPHERE: -7.50
OS_VA1: 20/50
OS_CYLINDER: -0.75
OS_AXIS: 180
OD_AXIS: 60
OS_SPHERE: -8.00
OD_VA1: 20/20

## 2024-11-12 ASSESSMENT — PACHYMETRY
OS_CT_CORRECTION: 1
OD_CT_CORRECTION: 1
OS_CT_UM: 538
OD_CT_UM: 521

## 2024-11-12 ASSESSMENT — VISUAL ACUITY
OS_BCVA: 20/20
OD_BCVA: 20/50

## 2024-11-12 ASSESSMENT — REFRACTION_AUTOREFRACTION
OD_CYLINDER: -0.25
OD_AXIS: 14
OS_SPHERE: -8.25
OS_AXIS: 19
OS_CYLINDER: -0.25
OD_SPHERE: -8.25

## 2024-11-12 ASSESSMENT — SUPERFICIAL PUNCTATE KERATITIS (SPK)
OS_SPK: 1+
OD_SPK: 1+

## 2024-11-12 ASSESSMENT — LID EXAM ASSESSMENTS: OD_EDEMA: ABSENT

## 2024-11-12 ASSESSMENT — TONOMETRY
OS_IOP_MMHG: 13
OD_IOP_MMHG: 11

## 2024-12-03 ENCOUNTER — RESULT REVIEW (OUTPATIENT)
Age: 49
End: 2024-12-03

## 2024-12-16 ENCOUNTER — OFFICE (OUTPATIENT)
Dept: URBAN - METROPOLITAN AREA CLINIC 122 | Facility: CLINIC | Age: 49
Setting detail: OPHTHALMOLOGY
End: 2024-12-16
Payer: COMMERCIAL

## 2024-12-16 ENCOUNTER — RX ONLY (RX ONLY)
Age: 49
End: 2024-12-16

## 2024-12-16 DIAGNOSIS — H40.1211: ICD-10-CM

## 2024-12-16 DIAGNOSIS — H16.223: ICD-10-CM

## 2024-12-16 DIAGNOSIS — H40.011: ICD-10-CM

## 2024-12-16 DIAGNOSIS — H40.1223: ICD-10-CM

## 2024-12-16 DIAGNOSIS — H35.40: ICD-10-CM

## 2024-12-16 DIAGNOSIS — H44.23: ICD-10-CM

## 2024-12-16 PROCEDURE — 92012 INTRM OPH EXAM EST PATIENT: CPT | Performed by: OPHTHALMOLOGY

## 2024-12-16 ASSESSMENT — PACHYMETRY
OS_CT_UM: 538
OD_CT_UM: 521
OD_CT_CORRECTION: 1
OS_CT_CORRECTION: 1

## 2024-12-16 ASSESSMENT — REFRACTION_CURRENTRX
OD_AXIS: 60
OS_AXIS: 180
OS_VPRISM_DIRECTION: SV
OS_CYLINDER: -0.75
OS_OVR_VA: 20/
OD_SPHERE: -7.50
OS_SPHERE: -8.00
OD_VPRISM_DIRECTION: SV
OD_OVR_VA: 20/
OD_CYLINDER: -0.50

## 2024-12-16 ASSESSMENT — REFRACTION_MANIFEST
OD_VA1: 20/20
OD_AXIS: 60
OS_VA1: 20/50
OS_SPHERE: -8.00
OS_CYLINDER: -0.75
OD_CYLINDER: -0.50
OS_AXIS: 180
OD_SPHERE: -7.50

## 2024-12-16 ASSESSMENT — VISUAL ACUITY
OD_BCVA: 20/50+2
OS_BCVA: 20/25+1

## 2024-12-16 ASSESSMENT — REFRACTION_AUTOREFRACTION
OS_AXIS: 19
OS_CYLINDER: -0.25
OS_SPHERE: -8.25
OD_AXIS: 14
OD_SPHERE: -8.25
OD_CYLINDER: -0.25

## 2024-12-16 ASSESSMENT — TONOMETRY
OD_IOP_MMHG: 11
OS_IOP_MMHG: 10

## 2024-12-16 ASSESSMENT — SUPERFICIAL PUNCTATE KERATITIS (SPK)
OS_SPK: 1+
OD_SPK: 1+

## 2025-01-02 ENCOUNTER — NON-APPOINTMENT (OUTPATIENT)
Age: 50
End: 2025-01-02

## 2025-01-27 ENCOUNTER — APPOINTMENT (OUTPATIENT)
Dept: BREAST CENTER | Facility: CLINIC | Age: 50
End: 2025-01-27
Payer: COMMERCIAL

## 2025-01-27 VITALS
SYSTOLIC BLOOD PRESSURE: 117 MMHG | HEART RATE: 72 BPM | DIASTOLIC BLOOD PRESSURE: 75 MMHG | OXYGEN SATURATION: 98 % | WEIGHT: 140 LBS | HEIGHT: 64 IN | BODY MASS INDEX: 23.9 KG/M2

## 2025-01-27 DIAGNOSIS — Z85.3 PERSONAL HISTORY OF MALIGNANT NEOPLASM OF BREAST: ICD-10-CM

## 2025-01-27 DIAGNOSIS — Z12.39 ENCOUNTER FOR OTHER SCREENING FOR MALIGNANT NEOPLASM OF BREAST: ICD-10-CM

## 2025-01-27 DIAGNOSIS — N60.12 DIFFUSE CYSTIC MASTOPATHY OF LEFT BREAST: ICD-10-CM

## 2025-01-27 DIAGNOSIS — Z90.11 ACQUIRED ABSENCE OF RIGHT BREAST AND NIPPLE: ICD-10-CM

## 2025-01-27 DIAGNOSIS — Z86.000 PERSONAL HISTORY OF IN-SITU NEOPLASM OF BREAST: ICD-10-CM

## 2025-01-27 DIAGNOSIS — R92.30 DENSE BREASTS, UNSPECIFIED: ICD-10-CM

## 2025-01-27 PROCEDURE — 99213 OFFICE O/P EST LOW 20 MIN: CPT

## 2025-02-21 ENCOUNTER — OFFICE (OUTPATIENT)
Dept: URBAN - METROPOLITAN AREA CLINIC 122 | Facility: CLINIC | Age: 50
Setting detail: OPHTHALMOLOGY
End: 2025-02-21
Payer: COMMERCIAL

## 2025-02-21 DIAGNOSIS — H44.23: ICD-10-CM

## 2025-02-21 DIAGNOSIS — H16.223: ICD-10-CM

## 2025-02-21 DIAGNOSIS — H40.1223: ICD-10-CM

## 2025-02-21 DIAGNOSIS — H35.40: ICD-10-CM

## 2025-02-21 PROCEDURE — 92012 INTRM OPH EXAM EST PATIENT: CPT | Mod: 25 | Performed by: OPHTHALMOLOGY

## 2025-02-21 PROCEDURE — 68761 CLOSE TEAR DUCT OPENING: CPT | Mod: 50 | Performed by: OPHTHALMOLOGY

## 2025-02-21 PROCEDURE — 92083 EXTENDED VISUAL FIELD XM: CPT | Performed by: OPHTHALMOLOGY

## 2025-02-21 ASSESSMENT — REFRACTION_CURRENTRX
OS_OVR_VA: 20/
OD_OVR_VA: 20/
OS_SPHERE: -8.00
OS_AXIS: 180
OD_VPRISM_DIRECTION: SV
OS_CYLINDER: -0.75
OD_CYLINDER: -0.50
OD_SPHERE: -7.50
OS_VPRISM_DIRECTION: SV
OD_AXIS: 60

## 2025-02-21 ASSESSMENT — TONOMETRY
OD_IOP_MMHG: 11
OS_IOP_MMHG: 12

## 2025-02-21 ASSESSMENT — REFRACTION_AUTOREFRACTION
OD_SPHERE: -8.25
OS_CYLINDER: -0.25
OD_AXIS: 14
OS_SPHERE: -8.25
OD_CYLINDER: -0.25
OS_AXIS: 19

## 2025-02-21 ASSESSMENT — REFRACTION_MANIFEST
OS_VA1: 20/50
OD_SPHERE: -7.50
OS_AXIS: 180
OS_SPHERE: -8.00
OS_CYLINDER: -0.75
OD_CYLINDER: -0.50
OD_VA1: 20/20
OD_AXIS: 60

## 2025-02-21 ASSESSMENT — PACHYMETRY
OS_CT_CORRECTION: 1
OD_CT_UM: 521
OS_CT_UM: 538
OD_CT_CORRECTION: 1

## 2025-02-21 ASSESSMENT — VISUAL ACUITY
OD_BCVA: 20/50
OS_BCVA: 20/25

## 2025-02-21 ASSESSMENT — SUPERFICIAL PUNCTATE KERATITIS (SPK)
OS_SPK: 1+
OD_SPK: 1+

## 2025-05-16 ENCOUNTER — OFFICE (OUTPATIENT)
Dept: URBAN - METROPOLITAN AREA CLINIC 122 | Facility: CLINIC | Age: 50
Setting detail: OPHTHALMOLOGY
End: 2025-05-16
Payer: COMMERCIAL

## 2025-05-16 DIAGNOSIS — H40.1223: ICD-10-CM

## 2025-05-16 DIAGNOSIS — H35.40: ICD-10-CM

## 2025-05-16 DIAGNOSIS — H16.223: ICD-10-CM

## 2025-05-16 DIAGNOSIS — H44.23: ICD-10-CM

## 2025-05-16 PROCEDURE — 92134 CPTRZ OPH DX IMG PST SGM RTA: CPT | Performed by: OPHTHALMOLOGY

## 2025-05-16 PROCEDURE — 92012 INTRM OPH EXAM EST PATIENT: CPT | Mod: 25 | Performed by: OPHTHALMOLOGY

## 2025-05-16 PROCEDURE — 68761 CLOSE TEAR DUCT OPENING: CPT | Mod: 50 | Performed by: OPHTHALMOLOGY

## 2025-05-16 ASSESSMENT — REFRACTION_MANIFEST
OS_AXIS: 180
OD_SPHERE: -7.50
OS_VA1: 20/50
OD_CYLINDER: -0.50
OD_AXIS: 60
OS_SPHERE: -8.00
OD_VA1: 20/20
OS_CYLINDER: -0.75

## 2025-05-16 ASSESSMENT — REFRACTION_CURRENTRX
OS_OVR_VA: 20/
OS_VPRISM_DIRECTION: SV
OS_AXIS: 180
OD_CYLINDER: -0.50
OD_SPHERE: -7.50
OD_VPRISM_DIRECTION: SV
OD_OVR_VA: 20/
OS_CYLINDER: -0.75
OD_AXIS: 60
OS_SPHERE: -8.00

## 2025-05-16 ASSESSMENT — REFRACTION_AUTOREFRACTION
OD_AXIS: 14
OS_AXIS: 19
OD_CYLINDER: -0.25
OS_CYLINDER: -0.25
OD_SPHERE: -8.25
OS_SPHERE: -8.25

## 2025-05-16 ASSESSMENT — TONOMETRY
OS_IOP_MMHG: 12
OS_IOP_MMHG: 11
OD_IOP_MMHG: 12
OD_IOP_MMHG: 11

## 2025-05-16 ASSESSMENT — SUPERFICIAL PUNCTATE KERATITIS (SPK)
OD_SPK: 1+
OS_SPK: 1+

## 2025-05-16 ASSESSMENT — PACHYMETRY
OD_CT_CORRECTION: 1
OS_CT_CORRECTION: 1
OS_CT_UM: 538
OD_CT_UM: 521

## 2025-05-16 ASSESSMENT — VISUAL ACUITY
OD_BCVA: 20/50
OS_BCVA: 20/25

## 2025-08-18 ENCOUNTER — OFFICE (OUTPATIENT)
Dept: URBAN - METROPOLITAN AREA CLINIC 122 | Facility: CLINIC | Age: 50
Setting detail: OPHTHALMOLOGY
End: 2025-08-18
Payer: COMMERCIAL

## 2025-08-18 DIAGNOSIS — H40.1223: ICD-10-CM

## 2025-08-18 DIAGNOSIS — H16.223: ICD-10-CM

## 2025-08-18 DIAGNOSIS — H44.23: ICD-10-CM

## 2025-08-18 DIAGNOSIS — H40.1211: ICD-10-CM

## 2025-08-18 DIAGNOSIS — H35.40: ICD-10-CM

## 2025-08-18 DIAGNOSIS — H40.011: ICD-10-CM

## 2025-08-18 PROCEDURE — 92083 EXTENDED VISUAL FIELD XM: CPT | Performed by: OPHTHALMOLOGY

## 2025-08-18 PROCEDURE — 92250 FUNDUS PHOTOGRAPHY W/I&R: CPT | Performed by: OPHTHALMOLOGY

## 2025-08-18 PROCEDURE — 92014 COMPRE OPH EXAM EST PT 1/>: CPT | Performed by: OPHTHALMOLOGY

## 2025-08-18 ASSESSMENT — SUPERFICIAL PUNCTATE KERATITIS (SPK)
OS_SPK: 1+
OD_SPK: 1+

## 2025-08-18 ASSESSMENT — REFRACTION_MANIFEST
OS_VA1: 20/50
OS_CYLINDER: -0.75
OD_VA1: 20/20
OS_AXIS: 180
OS_SPHERE: -8.00
OD_CYLINDER: -0.50
OD_SPHERE: -7.50
OD_AXIS: 60

## 2025-08-18 ASSESSMENT — REFRACTION_AUTOREFRACTION
OD_CYLINDER: -0.25
OS_CYLINDER: -0.25
OD_SPHERE: -8.25
OS_SPHERE: -8.25
OS_AXIS: 19
OD_AXIS: 14

## 2025-08-18 ASSESSMENT — TONOMETRY
OS_IOP_MMHG: 08
OD_IOP_MMHG: 08

## 2025-08-18 ASSESSMENT — PACHYMETRY
OS_CT_UM: 538
OS_CT_CORRECTION: 1
OD_CT_UM: 521
OD_CT_CORRECTION: 1

## 2025-08-18 ASSESSMENT — REFRACTION_CURRENTRX
OS_AXIS: 180
OS_CYLINDER: -0.75
OD_VPRISM_DIRECTION: SV
OS_SPHERE: -8.00
OS_OVR_VA: 20/
OD_SPHERE: -7.50
OD_AXIS: 60
OS_VPRISM_DIRECTION: SV
OD_OVR_VA: 20/
OD_CYLINDER: -0.50

## 2025-08-18 ASSESSMENT — VISUAL ACUITY
OD_BCVA: 20/60+2
OS_BCVA: 20/20